# Patient Record
Sex: MALE | Race: WHITE | NOT HISPANIC OR LATINO | URBAN - METROPOLITAN AREA
[De-identification: names, ages, dates, MRNs, and addresses within clinical notes are randomized per-mention and may not be internally consistent; named-entity substitution may affect disease eponyms.]

---

## 2017-02-21 ENCOUNTER — IMPORTED ENCOUNTER (OUTPATIENT)
Dept: URBAN - METROPOLITAN AREA CLINIC 1 | Facility: CLINIC | Age: 67
End: 2017-02-21

## 2017-02-21 PROBLEM — H35.363: Noted: 2017-02-21

## 2017-02-21 PROBLEM — D31.32: Noted: 2017-02-21

## 2017-02-21 PROBLEM — H25.813: Noted: 2017-02-21

## 2017-02-21 PROBLEM — H16.143: Noted: 2017-02-21

## 2017-02-21 PROBLEM — H04.123: Noted: 2017-02-21

## 2017-02-21 PROCEDURE — 92012 INTRM OPH EXAM EST PATIENT: CPT

## 2017-02-21 NOTE — PATIENT DISCUSSION
1.  Choroidal Nevus OS: Appears Benign and stable. Observe for changes. 2. JES now w/ PEK OU: The use of artificial tears OU TID were recommended. 3.  Cataract OU: Observe for now without intervention. The patient was advised to contact us if any change or worsening of vision4. Macular Drusen OU- Stable. 5.  Return for an appointment for a 30 in 6 months with Dr. Ronel Bañuelos.

## 2017-08-18 ENCOUNTER — IMPORTED ENCOUNTER (OUTPATIENT)
Dept: URBAN - METROPOLITAN AREA CLINIC 1 | Facility: CLINIC | Age: 67
End: 2017-08-18

## 2017-08-18 PROBLEM — H04.123: Noted: 2017-08-18

## 2017-08-18 PROBLEM — D31.32: Noted: 2017-08-18

## 2017-08-18 PROBLEM — H16.143: Noted: 2017-08-18

## 2017-08-18 PROBLEM — H25.813: Noted: 2017-08-18

## 2017-08-18 PROBLEM — H35.363: Noted: 2017-08-18

## 2017-08-18 PROCEDURE — 92014 COMPRE OPH EXAM EST PT 1/>: CPT

## 2017-08-18 NOTE — PATIENT DISCUSSION
1.  Choroidal Nevus OS: Appears Benign and stable. Observe for changes. 2. Cataract OU: Observe for now without intervention. The patient was advised to contact us if any change or worsening of vision3. JES w/ improved PEK OU- Increase ATs BID OU routinely. 4.  Macular Drusen OU- stable observe Letter to [address] for an appointment in 1 yr 30 glare with Dr. Tia Pacheco.

## 2018-08-21 ENCOUNTER — IMPORTED ENCOUNTER (OUTPATIENT)
Dept: URBAN - METROPOLITAN AREA CLINIC 1 | Facility: CLINIC | Age: 68
End: 2018-08-21

## 2018-08-21 PROBLEM — H16.143: Noted: 2018-08-21

## 2018-08-21 PROBLEM — D31.32: Noted: 2018-08-21

## 2018-08-21 PROBLEM — H04.123: Noted: 2018-08-21

## 2018-08-21 PROBLEM — H35.363: Noted: 2018-08-21

## 2018-08-21 PROBLEM — H25.813: Noted: 2018-08-21

## 2018-08-21 PROCEDURE — 92014 COMPRE OPH EXAM EST PT 1/>: CPT

## 2018-08-21 NOTE — PATIENT DISCUSSION
1.  Choroidal Nevus OS: Appears Benign and stable. Observe for changes. 2. Macular Drusen OU- Stable. Observe. 3. JES w/ PEK OU- No improvement. The increase of artificial tears OU to TID (more after swimming PRN)  were recommended routinely. 4.  Cataract OU: Observe for now without intervention. The patient was advised to contact us if any change or worsening of vision5. Return for an appointment for 27 in 1 year with Dr. Mirlande Parr.

## 2019-08-20 ENCOUNTER — IMPORTED ENCOUNTER (OUTPATIENT)
Dept: URBAN - METROPOLITAN AREA CLINIC 1 | Facility: CLINIC | Age: 69
End: 2019-08-20

## 2019-08-20 PROBLEM — H35.363: Noted: 2019-08-20

## 2019-08-20 PROBLEM — D31.32: Noted: 2019-08-20

## 2019-08-20 PROCEDURE — 92014 COMPRE OPH EXAM EST PT 1/>: CPT

## 2019-08-20 NOTE — PATIENT DISCUSSION
1.  Choroidal Nevus OS: Appears Benign and stable. Observe for changes. 2. Macular Drusen OU- Stable. Observe. 3. JES w/ PEK OU- symptomatic. Restart & increase ATs to TID OU Routinely. 4.  Cataract OU: Observe for now without intervention. The patient was advised to contact us if any change or worsening of visionLetter to PCP MRx deferred Return for an appointment in 1 yr 27 with Dr. Izabela Borja.

## 2020-08-25 ENCOUNTER — IMPORTED ENCOUNTER (OUTPATIENT)
Dept: URBAN - METROPOLITAN AREA CLINIC 1 | Facility: CLINIC | Age: 70
End: 2020-08-25

## 2020-08-25 PROBLEM — H16.143: Noted: 2020-08-25

## 2020-08-25 PROBLEM — H04.123: Noted: 2020-08-25

## 2020-08-25 PROBLEM — H25.813: Noted: 2020-08-25

## 2020-08-25 PROBLEM — D31.32: Noted: 2020-08-25

## 2020-08-25 PROBLEM — H35.363: Noted: 2020-08-25

## 2020-08-25 PROCEDURE — 92014 COMPRE OPH EXAM EST PT 1/>: CPT

## 2020-08-25 PROCEDURE — 92250 FUNDUS PHOTOGRAPHY W/I&R: CPT

## 2020-08-25 NOTE — PATIENT DISCUSSION
1.  Choroidal Nevus OS: Appears Benign and stable. Observe for changes. Macular Photo done today documenting 2DD Nevus in the temporal macula 2. Macular Drusen OU- Stable. Observe. 3. Cataract OU: Observe for now without intervention. The patient was advised to contact us if any change or worsening of vision4. JES w/ PEK OU-Recommend ATs TID OU routinely. Patient deferred [facility] Rx today. Return for an appointment in 1 year 27 with Dr. Nya Muhammad.

## 2021-08-31 ENCOUNTER — IMPORTED ENCOUNTER (OUTPATIENT)
Dept: URBAN - METROPOLITAN AREA CLINIC 1 | Facility: CLINIC | Age: 71
End: 2021-08-31

## 2021-08-31 PROBLEM — H16.143: Noted: 2021-08-31

## 2021-08-31 PROBLEM — H25.813: Noted: 2021-08-31

## 2021-08-31 PROBLEM — H04.123: Noted: 2021-08-31

## 2021-08-31 PROBLEM — H35.363: Noted: 2021-08-31

## 2021-08-31 PROCEDURE — 99214 OFFICE O/P EST MOD 30 MIN: CPT

## 2021-08-31 NOTE — PATIENT DISCUSSION
1.  Cataract OU -- Observe for now without intervention. The patient was advised to contact us if any change or worsening of vision2. JES w/ PEK OU -- Cont ATs TID OU routinely. 3.  Macular Drusen OU -- Stable. Observe. 4.  Choriodal Nevus OS -- Appears Benign stable. Observe. Patient deferred MRx at today's visit. Return for an appointment in 1 year 27 with Dr. Lata Abarca.

## 2021-12-15 ENCOUNTER — IMPORTED ENCOUNTER (OUTPATIENT)
Dept: URBAN - METROPOLITAN AREA CLINIC 1 | Facility: CLINIC | Age: 71
End: 2021-12-15

## 2021-12-15 PROCEDURE — 92015 DETERMINE REFRACTIVE STATE: CPT

## 2021-12-15 NOTE — PATIENT DISCUSSION
1.  Refract Only -- New MRx for glasses finalized given to patient. Return for an appointment as scheduled with Dr. Gayla Rausch.

## 2022-03-13 NOTE — PATIENT DISCUSSION
Discussed the importance of blood sugar control in the prevention of ocular complications. 13-Mar-2022 11:51

## 2022-04-03 ASSESSMENT — TONOMETRY
OS_IOP_MMHG: 12
OS_IOP_MMHG: 15
OD_IOP_MMHG: 14
OS_IOP_MMHG: 13
OS_IOP_MMHG: 14
OS_IOP_MMHG: 14
OD_IOP_MMHG: 14
OD_IOP_MMHG: 12
OD_IOP_MMHG: 15
OS_IOP_MMHG: 15

## 2022-04-03 ASSESSMENT — VISUAL ACUITY
OS_GLARE: 20/60
OD_CC: 20/30
OS_CC: J1+
OS_CC: J1+
OD_SC: 20/20
OS_SC: J3
OS_CC: 20/20-1
OD_SC: 20/30+1
OS_CC: J1+
OD_CC: J1+
OS_SC: 20/20
OD_CC: J2
OS_GLARE: 20/40
OS_SC: 20/20
OD_CC: 20/25
OD_GLARE: 20/50
OD_GLARE: 20/50
OS_SC: 20/20
OS_CC: J2
OS_CC: 20/30
OS_CC: J1+
OD_SC: 20/30-1
OD_CC: J1+
OD_SC: 20/20
OD_GLARE: 20/60
OD_SC: 20/20
OD_SC: 20/20
OS_GLARE: 20/60
OS_SC: 20/20
OD_SC: J3
OS_GLARE: 20/50
OD_CC: J1+
OD_CC: 20/30
OS_SC: 20/20
OD_CC: J1+
OS_CC: 20/30
OS_SC: 20/20
OD_GLARE: 20/60

## 2022-10-17 ENCOUNTER — COMPREHENSIVE EXAM (OUTPATIENT)
Dept: URBAN - METROPOLITAN AREA CLINIC 1 | Facility: CLINIC | Age: 72
End: 2022-10-17

## 2022-10-17 DIAGNOSIS — H04.123: ICD-10-CM

## 2022-10-17 DIAGNOSIS — H35.363: ICD-10-CM

## 2022-10-17 DIAGNOSIS — H16.143: ICD-10-CM

## 2022-10-17 DIAGNOSIS — H25.813: ICD-10-CM

## 2022-10-17 PROCEDURE — 99214 OFFICE O/P EST MOD 30 MIN: CPT

## 2022-10-17 ASSESSMENT — VISUAL ACUITY
OD_CC: 20/20-2
OS_CC: 20/25+2
OS_CC: J1
OD_CC: J1

## 2022-10-17 ASSESSMENT — TONOMETRY
OD_IOP_MMHG: 11
OS_IOP_MMHG: 11

## 2023-10-24 ENCOUNTER — COMPREHENSIVE EXAM (OUTPATIENT)
Dept: URBAN - METROPOLITAN AREA CLINIC 1 | Facility: CLINIC | Age: 73
End: 2023-10-24

## 2023-10-24 DIAGNOSIS — H04.123: ICD-10-CM

## 2023-10-24 DIAGNOSIS — H16.143: ICD-10-CM

## 2023-10-24 DIAGNOSIS — H25.813: ICD-10-CM

## 2023-10-24 DIAGNOSIS — D31.32: ICD-10-CM

## 2023-10-24 PROCEDURE — 99214 OFFICE O/P EST MOD 30 MIN: CPT

## 2023-10-24 ASSESSMENT — VISUAL ACUITY
OS_BAT: 20/30
OD_BAT: 20/30
OS_CC: J1+
OD_CC: 20/25
OD_CC: J1+
OS_CC: 20/25+2

## 2023-10-24 ASSESSMENT — TONOMETRY
OS_IOP_MMHG: 10
OD_IOP_MMHG: 10

## 2024-12-20 ENCOUNTER — COMPREHENSIVE EXAM (OUTPATIENT)
Age: 74
End: 2024-12-20

## 2024-12-20 DIAGNOSIS — H16.143: ICD-10-CM

## 2024-12-20 DIAGNOSIS — H35.363: ICD-10-CM

## 2024-12-20 DIAGNOSIS — D31.32: ICD-10-CM

## 2024-12-20 DIAGNOSIS — H02.834: ICD-10-CM

## 2024-12-20 DIAGNOSIS — H25.813: ICD-10-CM

## 2024-12-20 DIAGNOSIS — H18.413: ICD-10-CM

## 2024-12-20 DIAGNOSIS — H04.123: ICD-10-CM

## 2024-12-20 DIAGNOSIS — H02.831: ICD-10-CM

## 2024-12-20 PROCEDURE — 92015 DETERMINE REFRACTIVE STATE: CPT

## 2024-12-20 PROCEDURE — 99214 OFFICE O/P EST MOD 30 MIN: CPT
